# Patient Record
(demographics unavailable — no encounter records)

---

## 2025-02-11 NOTE — HEALTH RISK ASSESSMENT
[Never (0 pts)] : Never (0 points) [Yes] : In the past 12 months have you used drugs other than those required for medical reasons? Yes [HIV test declined] : HIV test declined [Hepatitis C test declined] : Hepatitis C test declined [None] : None [Fully functional (bathing, dressing, toileting, transferring, walking, feeding)] : Fully functional (bathing, dressing, toileting, transferring, walking, feeding) [Fully functional (using the telephone, shopping, preparing meals, housekeeping, doing laundry, using] : Fully functional and needs no help or supervision to perform IADLs (using the telephone, shopping, preparing meals, housekeeping, doing laundry, using transportation, managing medications and managing finances) [Reports normal functional visual acuity (ie: able to read med bottle)] : Reports normal functional visual acuity [Audit-CScore] : 3 [de-identified] : marijuana few times a month  [de-identified] : work out/run few times a week  [de-identified] : good  [[id number]] : 0 [Change in mental status noted] : No change in mental status noted [Reports changes in hearing] : Reports no changes in hearing [Reports changes in vision] : Reports no changes in vision [Reports changes in dental health] : Reports no changes in dental health [FreeTextEntry2] : Real estate

## 2025-02-11 NOTE — ASSESSMENT
[FreeTextEntry1] : 24 year old male presented for an annual physical exam.  routine blood work today, blood collected in the office. up to date with screening. will call back with results.

## 2025-02-11 NOTE — HISTORY OF PRESENT ILLNESS
[FreeTextEntry1] : Annual  [de-identified] : 24-year-old male with no significant past medical history presenting for an annual physical exam.  Patient is doing well and has no concerns today. Pt is recently engaged and is getting  May 2026.

## 2025-02-11 NOTE — HISTORY OF PRESENT ILLNESS
[FreeTextEntry1] : Annual  [de-identified] : 24-year-old male with no significant past medical history presenting for an annual physical exam.  Patient is doing well and has no concerns today. Pt is recently engaged and is getting  May 2026.

## 2025-02-11 NOTE — HEALTH RISK ASSESSMENT
[Never (0 pts)] : Never (0 points) [Yes] : In the past 12 months have you used drugs other than those required for medical reasons? Yes [HIV test declined] : HIV test declined [Hepatitis C test declined] : Hepatitis C test declined [None] : None [Fully functional (bathing, dressing, toileting, transferring, walking, feeding)] : Fully functional (bathing, dressing, toileting, transferring, walking, feeding) [Fully functional (using the telephone, shopping, preparing meals, housekeeping, doing laundry, using] : Fully functional and needs no help or supervision to perform IADLs (using the telephone, shopping, preparing meals, housekeeping, doing laundry, using transportation, managing medications and managing finances) [Reports normal functional visual acuity (ie: able to read med bottle)] : Reports normal functional visual acuity [Audit-CScore] : 3 [de-identified] : marijuana few times a month  [de-identified] : work out/run few times a week  [de-identified] : good  [[id number]] : 0 [Change in mental status noted] : No change in mental status noted [Reports changes in hearing] : Reports no changes in hearing [Reports changes in vision] : Reports no changes in vision [Reports changes in dental health] : Reports no changes in dental health [FreeTextEntry2] : Real estate